# Patient Record
Sex: FEMALE | Race: WHITE | NOT HISPANIC OR LATINO
[De-identification: names, ages, dates, MRNs, and addresses within clinical notes are randomized per-mention and may not be internally consistent; named-entity substitution may affect disease eponyms.]

---

## 2019-02-15 PROBLEM — Z00.00 ENCOUNTER FOR PREVENTIVE HEALTH EXAMINATION: Status: ACTIVE | Noted: 2019-02-15

## 2019-03-04 ENCOUNTER — APPOINTMENT (OUTPATIENT)
Dept: NEUROLOGY | Facility: CLINIC | Age: 64
End: 2019-03-04
Payer: COMMERCIAL

## 2019-03-04 VITALS
SYSTOLIC BLOOD PRESSURE: 133 MMHG | HEART RATE: 97 BPM | WEIGHT: 125 LBS | BODY MASS INDEX: 26.24 KG/M2 | HEIGHT: 58 IN | DIASTOLIC BLOOD PRESSURE: 82 MMHG

## 2019-03-04 DIAGNOSIS — G20 PARKINSON'S DISEASE: ICD-10-CM

## 2019-03-04 PROCEDURE — 95983 ALYS BRN NPGT PRGRMG 15 MIN: CPT

## 2019-03-04 PROCEDURE — 99214 OFFICE O/P EST MOD 30 MIN: CPT

## 2019-03-04 NOTE — DISCUSSION/SUMMARY
[FreeTextEntry1] : PD s/p b/l GPi DBS with wearing offs c/b depression and sleep fragmentation\par \par Recommendations\par DBS adjusted\par       DBS programming 10minutes.\par \par Raise rytary 95 to 4-3-4-3 tabs (7-11-3-7)\par She can substitute last rytary for 1 tab parcopa when she going out\par cont remeron 45mg qhs\par increase melatonin to 7-8 mg qhs\par She will call neuropsychiatrist at [facility] close to where she lives for a consultation re. her depression\par Encouraged exercising\par RTC 3months

## 2019-03-04 NOTE — PHYSICAL EXAM
[FreeTextEntry1] : Emotional at times in the office. Mil masking and mild hypophonia. There are no tremors or LID. Rapid movements are 1.5+. tone is normal. stands easily and has 1 step hesitation. Cruises well with stable turns. Postural reflexes are intact

## 2019-03-04 NOTE — HISTORY OF PRESENT ILLNESS
[FreeTextEntry1] : Patient with PD s/p B/l DBS who is well known to me. She reports that when she takes 4 tabs of rytary 95 she gets 4hrs of ON time. Three tabs last about 3hrs and towards the evenings she feels slower with propensity to have freezing episodes. She does not have LID but feels internal tremoring. Continues to feel depressed and has not connected with a therapist. She does not have thoughts of harming herself but is bothered by her mobility issues in the evenings which have led to several falls. High protein dinner appears to blunt effect of rytary dose and finds that when she takes 1 tab of parcopa 25/100 during that time, her ON period is better. \par \par She gets about 5hrs of sleep with remeron and melatonin combination. Exercises every other day\par \par PD meds\par rytary 95 4-3-3-3 (7-11-3-7)\par remeron 45mg qhs\par melatonin 5mg qhs

## 2019-03-05 ENCOUNTER — TRANSCRIPTION ENCOUNTER (OUTPATIENT)
Age: 64
End: 2019-03-05

## 2019-04-19 ENCOUNTER — OTHER (OUTPATIENT)
Age: 64
End: 2019-04-19

## 2019-04-19 DIAGNOSIS — G47.00 INSOMNIA, UNSPECIFIED: ICD-10-CM

## 2019-04-24 ENCOUNTER — OTHER (OUTPATIENT)
Age: 64
End: 2019-04-24

## 2019-05-01 ENCOUNTER — OTHER (OUTPATIENT)
Age: 64
End: 2019-05-01

## 2019-05-13 RX ORDER — TRAZODONE HYDROCHLORIDE 100 MG/1
100 TABLET ORAL
Qty: 30 | Refills: 2 | Status: ACTIVE | COMMUNITY
Start: 2019-04-19 | End: 1900-01-01

## 2019-06-28 ENCOUNTER — OTHER (OUTPATIENT)
Age: 64
End: 2019-06-28

## 2019-07-08 ENCOUNTER — APPOINTMENT (OUTPATIENT)
Dept: NEUROLOGY | Facility: CLINIC | Age: 64
End: 2019-07-08
Payer: COMMERCIAL

## 2019-07-08 VITALS
HEART RATE: 82 BPM | BODY MASS INDEX: 24.35 KG/M2 | SYSTOLIC BLOOD PRESSURE: 108 MMHG | HEIGHT: 58 IN | WEIGHT: 116 LBS | DIASTOLIC BLOOD PRESSURE: 67 MMHG

## 2019-07-08 DIAGNOSIS — M54.2 CERVICALGIA: ICD-10-CM

## 2019-07-08 PROCEDURE — 99214 OFFICE O/P EST MOD 30 MIN: CPT

## 2019-07-08 PROCEDURE — 95983 ALYS BRN NPGT PRGRMG 15 MIN: CPT

## 2019-07-08 NOTE — HISTORY OF PRESENT ILLNESS
[FreeTextEntry1] : Patient reports that over the past 2 months she has been having episodic pain the base of her head and radiates onto her shoulder and occasionally into her arms. It is usually self-limited--lasting several minutes. She continues to take rytary four times per day. Her sleeping is much improved and gets 8 hrs per night. She takes linzess prn for constipation. She occasionally takes a parcopa at night for rigidity. Has sporadic truncal and LE dyskinesia that are bothersome. \par \par She started seeing a psychiatrist for depression/anxiety. She was started on prozac which has provided some benefit. She also sees a therapist. She has not been exercising regularly. C/o memory issues, sht term >long term. \par \par PD Meds\par rytary 4-3-4-3 (7-11-3-7)\par linzess\par trazodone 100mg qhs\par prozac 20mg qd\par \par \par

## 2019-07-08 NOTE — PHYSICAL EXAM
[FreeTextEntry1] : There is mild masking. There are mild dyskinesia in her feet that increase with distraction. There is a finger dystonia on her left hand with mild left side bradykinesia. tone is normal. Gait and station are stable. Neck is supple. Strength 5/5 with 2+ DTRs.

## 2019-07-08 NOTE — DISCUSSION/SUMMARY
[FreeTextEntry1] : PD with GPi DBS whose episodic cervicalgia may be radicular. \par Mild dyskinesia\par Depression is slowly improving and may be affecting her memory\par \par Plan\par - DBS adjusted\par - leave rytary regimen the same\par - MRI c/spine wo contrast \par - f/u GI re. linzess for constipation\par - f/u Psych for management of depression. \par - cont trazodone for fragmented sleep\par - she will fax recent blood work for me to review in light of memory issues\par \par RTO 3months

## 2019-07-08 NOTE — PROCEDURE
[FreeTextEntry1] : Deep Brain Stimulation Programming: Refer to scanned form(s)\par \par Impedances WNL both sitting and laying\par \par L/R DBS - dDBS utilized with increased PW to better control dyskinesia/dystonia

## 2019-07-11 ENCOUNTER — OTHER (OUTPATIENT)
Age: 64
End: 2019-07-11

## 2019-10-21 ENCOUNTER — APPOINTMENT (OUTPATIENT)
Dept: NEUROLOGY | Facility: CLINIC | Age: 64
End: 2019-10-21
Payer: COMMERCIAL

## 2019-10-21 VITALS
BODY MASS INDEX: 26.03 KG/M2 | SYSTOLIC BLOOD PRESSURE: 119 MMHG | HEIGHT: 58 IN | HEART RATE: 79 BPM | DIASTOLIC BLOOD PRESSURE: 72 MMHG | WEIGHT: 124 LBS

## 2019-10-21 PROCEDURE — 99214 OFFICE O/P EST MOD 30 MIN: CPT

## 2019-10-21 PROCEDURE — 95983 ALYS BRN NPGT PRGRMG 15 MIN: CPT

## 2019-10-22 NOTE — PROCEDURE
[FreeTextEntry1] : Deep Brain Stimulation Programming: Refer to scanned form(s)\par \par Increased PW b/l to try and control mild LID

## 2019-10-22 NOTE — PHYSICAL EXAM
[FreeTextEntry1] : There is mild masking. EOMI. Tremors are absent. Brad 1-2+ L>R. Tone is normal. Athetotic movements present in her toes. Gait is normal with no FOG. Left armswing is asymmetric. Postural reflexes intact

## 2019-10-22 NOTE — DISCUSSION/SUMMARY
[FreeTextEntry1] : FOG episodes vary and she feels that lower rytary dose yields a better response. \par Depression is better controlled. \par LID\par \par \par Recommendations:\par - DBS adjusted\par - lower rytary to 3 tabs QID\par - cont trazodone 100mg qhs\par - cont melatonin\par - psychotropics per psychiatry\par - monitor FOG occurrences in order to figure out extent of possible wearing offs\par \par RTO 3-4 months

## 2019-10-22 NOTE — HISTORY OF PRESENT ILLNESS
[FreeTextEntry1] : Since her last visit, her cervicalgia improved spontaneously. Her depression has improved and is maintained on Cymbalta. She reports freezing episodes throughout the day and has mild dyskinesia in her feet that did not improve with last DBS changes. She had 5 falls 3 weeks ago due to FOG. \par She experiences sporadic wearings offs after 3-4 hrs. Her constipation is better managed with linzess.\par \par PD Meds\par rytary 95 4-3-4-3 (7-11-3-7)\par linzess\par trazodone 100mg qhs\par melatonin 10mg qhs\par cymbalta 40mg qd\par celexa\par

## 2019-11-20 ENCOUNTER — RX RENEWAL (OUTPATIENT)
Age: 64
End: 2019-11-20

## 2019-12-16 ENCOUNTER — RX RENEWAL (OUTPATIENT)
Age: 64
End: 2019-12-16

## 2020-02-24 ENCOUNTER — APPOINTMENT (OUTPATIENT)
Dept: NEUROLOGY | Facility: CLINIC | Age: 65
End: 2020-02-24
Payer: COMMERCIAL

## 2020-02-24 VITALS
DIASTOLIC BLOOD PRESSURE: 77 MMHG | WEIGHT: 125 LBS | HEIGHT: 58 IN | BODY MASS INDEX: 26.24 KG/M2 | HEART RATE: 94 BPM | SYSTOLIC BLOOD PRESSURE: 133 MMHG

## 2020-02-24 PROCEDURE — 95970 ALYS NPGT W/O PRGRMG: CPT

## 2020-02-24 PROCEDURE — 99214 OFFICE O/P EST MOD 30 MIN: CPT

## 2020-02-24 NOTE — HISTORY OF PRESENT ILLNESS
[FreeTextEntry1] : Since her last visit, her depression has improved. She is doing well motorically. She does not have any motor fluctuations. LID are well controlled. \par \par She had a several falls due to freezing. FOG episodes occurs when turning--which are occasional. \par sleep is good. Has mild non bothersome toe curling dystonia on left foot. \par Linzess works well for constipation\par \par PD Meds\par rytary 95 3-3-3-3 (7-11-3-7)\par linzess\par trazodone 100mg qhs\par melatonin 10mg qhs\par cymbalta 40mg qd\par celexa\par abilify 5mg qd\par

## 2020-02-24 NOTE — DISCUSSION/SUMMARY
[FreeTextEntry1] : Patient is motorically stable\par Depression has improved\par \par Patient counseled to continue current PD regimen. Her DBS was not changed today. \par Increase exercising\par \par RTO 3-4months

## 2020-02-24 NOTE — PHYSICAL EXAM
[FreeTextEntry1] : There is mild masking. EOMI. Tremors are absent. Brad 1+ L>R. Tone is normal.  Gait is normal with no FOG. Left armswing is asymmetric. Postural reflexes intact

## 2020-02-24 NOTE — PROCEDURE
[FreeTextEntry1] : Deep Brain Stimulation Programming: Refer to scanned form(s)\par DBS programming- 5 mins

## 2020-07-07 ENCOUNTER — APPOINTMENT (OUTPATIENT)
Dept: NEUROLOGY | Facility: CLINIC | Age: 65
End: 2020-07-07

## 2020-07-24 ENCOUNTER — APPOINTMENT (OUTPATIENT)
Dept: NEUROLOGY | Facility: CLINIC | Age: 65
End: 2020-07-24
Payer: COMMERCIAL

## 2020-07-24 ENCOUNTER — APPOINTMENT (OUTPATIENT)
Dept: NEUROLOGY | Facility: CLINIC | Age: 65
End: 2020-07-24

## 2020-07-24 PROCEDURE — 99214 OFFICE O/P EST MOD 30 MIN: CPT | Mod: 95

## 2020-07-24 NOTE — HISTORY OF PRESENT ILLNESS
[FreeTextEntry1] : She has been in the house for the past few months. Her physical activity level has lessened. \par She is maintained on 3 tabs of rytary 4 times per day\par She has occasional wearing offs. However, she thinks it may attributed to food. Notes that protein intake clearly impacts dose efficacy. \par She has less morning akinesia since the last DBS adjustment\par Sleep is good\par Denies any dysphagia\par Urinary incontinence has increased\par Reports some toe curling that may be occurring towards the end of her dose. \par Has word finding difficulties \par Depression has increased during covid. Continues to see her psychiatrist\par \par PD Meds\par rytary 95 3-3-3-3 (7-11-3-7)\par linzess\par trazodone 100mg qhs\par melatonin 10mg qhs\par cymbalta 40mg qd\par celexa\par abilify 5mg qd\par

## 2020-07-24 NOTE — DISCUSSION/SUMMARY
[FreeTextEntry1] : PD with mild wearing off dystonia\par Mild cognitive deficits\par UI\par \par Patient copunseled on the following:\par - cont rytary regimen\par - will need DBS assessment in the office soon\par - f/u with psychiatrist\par - encouraged increased exercise. She would like to try neuriva for cognitive complaints\par - referred t urologist for UI\par  f/u 2-3months

## 2020-07-24 NOTE — PHYSICAL EXAM
[FreeTextEntry1] : There is mild masking. EOMI. Tremors are absent. Brad 1+ L>R.  Gait is normal with no FOG. Left armswing is asymmetric.

## 2020-09-08 ENCOUNTER — APPOINTMENT (OUTPATIENT)
Dept: NEUROLOGY | Facility: CLINIC | Age: 65
End: 2020-09-08
Payer: COMMERCIAL

## 2020-09-08 VITALS
SYSTOLIC BLOOD PRESSURE: 138 MMHG | BODY MASS INDEX: 28.97 KG/M2 | WEIGHT: 138 LBS | DIASTOLIC BLOOD PRESSURE: 85 MMHG | HEIGHT: 58 IN | HEART RATE: 99 BPM

## 2020-09-08 VITALS — TEMPERATURE: 97.6 F

## 2020-09-08 PROCEDURE — 95970 ALYS NPGT W/O PRGRMG: CPT

## 2020-09-08 PROCEDURE — 99214 OFFICE O/P EST MOD 30 MIN: CPT

## 2020-09-08 NOTE — DISCUSSION/SUMMARY
[FreeTextEntry1] : PD who is motorically stable and well controlled at this time\par \par \par Patient copunseled on the following:\par - cont rytary regimen\par - DBS unchanged. Will monitor battery levels. Will have [employer] rep instruct patient how to check her DBS IPGs monthly. \par - f/u with psychiatrist\par - encouraged increased exercise.\par -f/u PCP re. AM body aches. r/o rheumatological process\par  f/t4mgppyc

## 2020-09-08 NOTE — PROCEDURE
[FreeTextEntry1] : ABBOTT\par \par Right DBS\par Contacts:2A-3AC-\par Parameters:3.6/140/130\par Impedances:ok\par Battery:2.79\par \par Notes:\par \par Final Settings: \par \par Left DBS\par Contacts:2AB\par Parameters:3.3/130/130\par Impedances:4 H\par Battery:2.80\par \par Notes:\par \par Final Settings: unch\par \par \par DBS programming time(mins):5\par

## 2020-09-08 NOTE — PHYSICAL EXAM
[FreeTextEntry1] : There is mild masking. EOMI. Tremors are absent. Tone is normal Brad 1+.  Gait is normal with no FOG. Left armswing is asymmetric.

## 2020-09-08 NOTE — HISTORY OF PRESENT ILLNESS
[FreeTextEntry1] : \par Patient has been overall motorically stable. Denies any motor fluctuctions or LID. Toe curling at night has improved. she has gained weight from abilify. Depression persists but she has been socializing more. Sleeps well at night, however stay in one position. Does not have morning hypokinesia but c/o of diffuse body aches upon awakening. COgnition stable. UI improved with vesicare. \par \par \par PD Meds\par rytary 95 3-3-3-3 (7-11-3-7)\par linzess\par trazodone 100mg qhs\par melatonin 10mg qhs\par cymbalta 40mg qd\par celexa\par abilify 5mg qd\par vesicare 5mg qd\par

## 2020-12-07 ENCOUNTER — NON-APPOINTMENT (OUTPATIENT)
Age: 65
End: 2020-12-07

## 2021-02-10 ENCOUNTER — APPOINTMENT (OUTPATIENT)
Dept: NEUROLOGY | Facility: CLINIC | Age: 66
End: 2021-02-10
Payer: COMMERCIAL

## 2021-02-10 VITALS
DIASTOLIC BLOOD PRESSURE: 86 MMHG | WEIGHT: 132 LBS | HEART RATE: 102 BPM | SYSTOLIC BLOOD PRESSURE: 144 MMHG | HEIGHT: 58 IN | BODY MASS INDEX: 27.71 KG/M2

## 2021-02-10 PROCEDURE — 99072 ADDL SUPL MATRL&STAF TM PHE: CPT

## 2021-02-10 PROCEDURE — 95983 ALYS BRN NPGT PRGRMG 15 MIN: CPT

## 2021-02-10 PROCEDURE — 99214 OFFICE O/P EST MOD 30 MIN: CPT | Mod: 25

## 2021-02-10 NOTE — PROCEDURE
[FreeTextEntry1] : ABBOTT\par \par Right DBS\par Contacts:2A-3AC-\par Parameters:3.6/140/130\par Impedances:ok\par Battery:2.96\par \par Notes:\par \par Final Settings: unch\par \par Left DBS\par Contacts:2AB\par Parameters:3.3/130/130 Prg 1\par Impedances:2B, 4 H\par Battery:2.96\par \par Notes:switch stim off 2B and lowered amplitude to alleviate random dyskinesia\par \par Final Settings: 2A: 3.1/130/130 Prg2 (2.7-3.3)\par \par \par DBS programming time(mins):10\par

## 2021-02-10 NOTE — DISCUSSION/SUMMARY
[FreeTextEntry1] : PD who is motorically stable with GPi DBS\par r/o neuropathy in LE vs. raynauds\par \par Patient counseled on the following:\par - cont rytary regimen\par - DBS adjusted as outlined\par - f/u with psychiatrist\par - encouraged increased exercise. \par - referred to neuromuscular specialist in [place] for neuropathy eval.\par \par  f/u 4months

## 2021-06-15 ENCOUNTER — APPOINTMENT (OUTPATIENT)
Dept: NEUROLOGY | Facility: CLINIC | Age: 66
End: 2021-06-15
Payer: COMMERCIAL

## 2021-06-15 VITALS
WEIGHT: 125 LBS | DIASTOLIC BLOOD PRESSURE: 80 MMHG | HEIGHT: 58 IN | HEART RATE: 108 BPM | SYSTOLIC BLOOD PRESSURE: 122 MMHG | BODY MASS INDEX: 26.24 KG/M2

## 2021-06-15 PROCEDURE — 99214 OFFICE O/P EST MOD 30 MIN: CPT | Mod: 25

## 2021-06-15 PROCEDURE — 99072 ADDL SUPL MATRL&STAF TM PHE: CPT

## 2021-06-15 PROCEDURE — 95983 ALYS BRN NPGT PRGRMG 15 MIN: CPT

## 2021-06-15 RX ORDER — FLUOXETINE HYDROCHLORIDE 20 MG/1
20 CAPSULE ORAL
Refills: 0 | Status: DISCONTINUED | COMMUNITY
End: 2021-06-15

## 2021-06-15 NOTE — HISTORY OF PRESENT ILLNESS
[FreeTextEntry1] : Patient reports increased depression and anxiety\par She feels that her second dose of rytary  has delayed ON\par she is not exercising\par denies any falls or freezing\par Continues to be bothered by changes in in her feet- discoloration, ? cracking and numbness, \par A1c was <6 recently. Rheum w.u ongoing for possible raynauds\par Has intermittent truncal LID\par \par Nonmotor: \par sleep is ok\par no dysphagia\par \par PD Meds\par rytary 95 3-3-3-3 (7-11-3-7)\par linzess\par trazodone 100mg qhs\par melatonin 10mg qhs\par cymbalta 60mg qhs\par citomel 25mg qhs\par celexa 20mg qd\par vesicare 5mg qd\par

## 2021-06-15 NOTE — DISCUSSION/SUMMARY
[FreeTextEntry1] : PD with increased depression and off periods around her 2nd dose\par ?neuropathy\par \par Patient counseled on the following:\par - increase 2nd dose of rytary to 4 tabs, leave other doses the same\par - will need DBS assessment in the office soon\par - f/u with psychiatrist\par - encouraged increased exercise. \par - referred to Dr. Huddleston for neuropathy evaluation\par \par  f/u 2-3months

## 2021-06-15 NOTE — PHYSICAL EXAM
[FreeTextEntry1] : There is mild masking. EOMI. Tremors are absent. Brad 1+ L>R.  Gait is normal with no FOG. Left armswing is asymmetric. Soles of feet have a red hue. Skin changes noted on shins ?PVD\par Intact LT and JPS\par DTRS 2+ \par

## 2021-06-15 NOTE — PROCEDURE
[FreeTextEntry1] : ABBOTT\par \par Right DBS\par Contacts:2A-3AC-\par Parameters:3.6/140/130\par Impedances:ok\par Battery:2.95\par \par Notes:\par \par Final Settings: 3.7/140/130\par \par Left DBS\par Contacts:2A\par Parameters:3.10/130/130\par Impedances:1, 4 H, 2B\par Battery:2.95\par \par Notes:\par \par Final Settings: 3.3/130/130\par \par \par DBS programming time(mins):10\par

## 2021-08-16 ENCOUNTER — NON-APPOINTMENT (OUTPATIENT)
Age: 66
End: 2021-08-16

## 2021-09-03 ENCOUNTER — APPOINTMENT (OUTPATIENT)
Dept: NEUROLOGY | Facility: CLINIC | Age: 66
End: 2021-09-03
Payer: COMMERCIAL

## 2021-09-03 VITALS
HEIGHT: 58 IN | BODY MASS INDEX: 25.19 KG/M2 | SYSTOLIC BLOOD PRESSURE: 128 MMHG | DIASTOLIC BLOOD PRESSURE: 77 MMHG | HEART RATE: 88 BPM | WEIGHT: 120 LBS

## 2021-09-03 PROCEDURE — 99215 OFFICE O/P EST HI 40 MIN: CPT | Mod: 25

## 2021-09-03 PROCEDURE — 99072 ADDL SUPL MATRL&STAF TM PHE: CPT

## 2021-09-03 PROCEDURE — 95970 ALYS NPGT W/O PRGRMG: CPT

## 2021-09-10 ENCOUNTER — APPOINTMENT (OUTPATIENT)
Dept: NEUROLOGY | Facility: CLINIC | Age: 66
End: 2021-09-10

## 2021-09-13 DIAGNOSIS — K59.00 CONSTIPATION, UNSPECIFIED: ICD-10-CM

## 2021-09-27 ENCOUNTER — NON-APPOINTMENT (OUTPATIENT)
Age: 66
End: 2021-09-27

## 2021-10-01 ENCOUNTER — APPOINTMENT (OUTPATIENT)
Dept: NEUROLOGY | Facility: CLINIC | Age: 66
End: 2021-10-01

## 2022-01-07 ENCOUNTER — APPOINTMENT (OUTPATIENT)
Dept: NEUROLOGY | Facility: CLINIC | Age: 67
End: 2022-01-07
Payer: COMMERCIAL

## 2022-01-07 DIAGNOSIS — R41.89 OTHER SYMPTOMS AND SIGNS INVOLVING COGNITIVE FUNCTIONS AND AWARENESS: ICD-10-CM

## 2022-01-07 PROCEDURE — 99442: CPT | Mod: 95

## 2022-01-07 NOTE — DISCUSSION/SUMMARY
[FreeTextEntry1] : PD with cognitive deficits and LID\par \par Patient copunseled on the following:\par - consider reducing 1-2 doses of rytary to 2 tabs. She will keep log as to when she is getting bothersome LID\par - will check b12, TFTs, folate, B1, B6 as part of cognitive w/u. Will consider neuropsych testing at next visit\par \par f/u 3months

## 2022-01-07 NOTE — HISTORY OF PRESENT ILLNESS
[FreeTextEntry1] : Amwell failed. Switched to TTM\par \par Patient  reports increased LID. She cannot determine pattern to when they occur\par SHe feels her memory has declined. Her depression fluctuates and has been under great deal of stress due to recent move to [place].  She reports trouble remembering events that occurred on the same day.  No VH\par Denies any falls. May have slight start hesitation when getting out of a car. \par Tremors have broken through mildly 2/2 stressors\par \par \par \par Nonmotor: \par sleep is ok\par no constipation with linzess\par no dysphagia\par \par PD Meds\par rytary 95 3-3-3-3 (7-11-3-7)\par linzess\par trazodone 100mg qhs\par melatonin 10mg qhs\par cymbalta 60mg qhs\par citomel 25mg qhs\par celexa 20mg qd\par vesicare 5mg qd\par

## 2022-01-07 NOTE — REASON FOR VISIT
[Home] : at home, [unfilled] , at the time of the visit. [Medical Office: ([facility])___] : at the medical office located in  [Verbal consent obtained from patient] : the patient, [unfilled] [Follow-Up: _____] : a [unfilled] follow-up visit

## 2022-03-09 ENCOUNTER — NON-APPOINTMENT (OUTPATIENT)
Age: 67
End: 2022-03-09

## 2022-04-15 ENCOUNTER — APPOINTMENT (OUTPATIENT)
Dept: NEUROLOGY | Facility: CLINIC | Age: 67
End: 2022-04-15
Payer: COMMERCIAL

## 2022-04-15 VITALS
HEIGHT: 58 IN | DIASTOLIC BLOOD PRESSURE: 82 MMHG | HEART RATE: 90 BPM | WEIGHT: 118 LBS | SYSTOLIC BLOOD PRESSURE: 138 MMHG | BODY MASS INDEX: 24.77 KG/M2

## 2022-04-15 DIAGNOSIS — F99 MENTAL DISORDER, NOT OTHERWISE SPECIFIED: ICD-10-CM

## 2022-04-15 PROCEDURE — 99214 OFFICE O/P EST MOD 30 MIN: CPT | Mod: 25

## 2022-04-15 PROCEDURE — 95983 ALYS BRN NPGT PRGRMG 15 MIN: CPT

## 2022-04-15 NOTE — PHYSICAL EXAM
[FreeTextEntry1] : There is mild masking. EOMI. Tremors are absent. Brad 1+ L>R.  Gait is normal with no FOG. Left armswing is asymmetric. Mild overflow movements in LE

## 2022-04-15 NOTE — DISCUSSION/SUMMARY
[FreeTextEntry1] : PD with cognitive deficits and increased paranoid delusions. Rytary may be contributing to her neurobehavioral changes, but patient may have underlying vulnerability from PD. \par \par Patient was counseled on the following recommendations:\par \par - DBS adjusted\par - reduce rytary to 3-2-3-2. Will attempt to transition her to sinemet over time\par =f/u psych\par \par we will talk in 1month at which time i plan to  reduce rytary to 2 tabs qid\par \par f/u 3months\par

## 2022-04-15 NOTE — HISTORY OF PRESENT ILLNESS
[FreeTextEntry1] : Patient reports a falling out with her friends over the past several years that has engendered some level of paranoia. She thinks that her friends are communicating with her doctors and other people she recently met to discredit her. She associates all bad interpersonal encounters , which based on her 's account, are likely delusional. She also  describes hearing certain emotionally charged phrases more than once when in conversation and has increased difficult multitasking and managing stress. \par \par she does not wear off between her doses. Fell one month ago while adjusting drapes: fell backwards and injured her right shoulder. \par \par Nonmotor: \par sleep is ok\par no constipation with linzess\par no dysphagia\par Anxiety has increased and having difficulty managing stressful issues\par \par \par PD Meds\par rytary 95 3-3-3-3 (7-11-3-7)\par linzess\par trazodone 50mg qhs\par melatonin 10mg qhs\par cymbalta 60mg qhs\par citomel 25mg qhs\par celexa 20mg qd\par vesicare 5mg qd\par

## 2022-07-01 ENCOUNTER — APPOINTMENT (OUTPATIENT)
Dept: NEUROLOGY | Facility: CLINIC | Age: 67
End: 2022-07-01

## 2022-07-01 VITALS
BODY MASS INDEX: 25.61 KG/M2 | HEART RATE: 94 BPM | SYSTOLIC BLOOD PRESSURE: 130 MMHG | HEIGHT: 58 IN | WEIGHT: 122 LBS | DIASTOLIC BLOOD PRESSURE: 76 MMHG

## 2022-07-01 PROCEDURE — 99215 OFFICE O/P EST HI 40 MIN: CPT | Mod: 25

## 2022-07-01 PROCEDURE — 95983 ALYS BRN NPGT PRGRMG 15 MIN: CPT

## 2022-07-01 RX ORDER — LINACLOTIDE 145 UG/1
145 CAPSULE, GELATIN COATED ORAL
Qty: 90 | Refills: 3 | Status: ACTIVE | COMMUNITY
Start: 1900-01-01 | End: 1900-01-01

## 2022-07-01 NOTE — PROCEDURE
[FreeTextEntry1] : ABBOTT\par \par Right DBS\par Contacts:2A-3AC-\par Parameters:3.8/140/130\par Impedances:ok\par Battery:2.92\par \par Notes:3.9 tw\par \par Final Settings: 3.9/140/130 (3-4)\par \par Left DBS\par Contacts:2A\par Parameters:3.50/130/130\par Impedances: 4 H, 2B\par Battery:2.92\par \par Notes:3.7 TW\par \par Final Settings: 3.7/130/130 (3-4)\par \par \par DBS programming time(mins):10\par

## 2022-07-01 NOTE — PHYSICAL EXAM
[FreeTextEntry1] : There is mild masking. EOMI. Tremors are absent. Brad 1+ L>R.  Mild dyskinesia in her LE present. Gait is normal with no FOG.

## 2022-07-01 NOTE — HISTORY OF PRESENT ILLNESS
[FreeTextEntry1] : Patient is on lower dose of rytary with some reduction in paranoid ideation. \par Tolerated exelon 4.6mg and when she increased to 9.5 she had excessive salivation. She has since stopped it. \par Not exercising much\par Thinks she had COVID in early June with sore throat, headaches and fatigue for 1 week\par fell twice since her last visit: 1) while grabbing something in her closet over her head; she fell into the closet\par 2) occurred in the bedroom; details are not clear. Its hard for her to pick herself up w/o assistance when she falls. \par notes LE dyskinesia at rest\par \par \par Nonmotor: \par sleep is ok\par no constipation with linzess\par no dysphagia\par Anxiety- has social anxiety that exacerbates PD symptoms at times\par \par \par PD Meds\par rytary 95 3-2-3-2 (8-12-4-8)\par linzess\par trazodone 50mg qhs\par melatonin 10mg qhs\par cymbalta 60mg qhs\par citomel 25mg qhs\par celexa 20mg qd\par vesicare 5mg qd\par

## 2022-07-01 NOTE — DISCUSSION/SUMMARY
[FreeTextEntry1] : PD with cognitive deficits and increased paranoid delusions that appears to be responding to lower Rytary. Social anxiety is risking increased isolation and can unravel her MDD\par \par Patient was counseled on the following recommendations:\par \par - DBS adjusted\par - reduce rytary to 2-2-2-2. will utilize sinemet + rytary in the future if needed\par -f/u psych\par - increase exercising \par \par f/u 3months\par

## 2022-11-18 ENCOUNTER — APPOINTMENT (OUTPATIENT)
Dept: NEUROLOGY | Facility: CLINIC | Age: 67
End: 2022-11-18

## 2022-11-18 PROCEDURE — 99214 OFFICE O/P EST MOD 30 MIN: CPT | Mod: 95

## 2022-11-18 RX ORDER — RIVASTIGMINE 9.5 MG/24H
9.5 PATCH, EXTENDED RELEASE TRANSDERMAL DAILY
Qty: 30 | Refills: 3 | Status: COMPLETED | COMMUNITY
Start: 2022-04-15 | End: 2022-11-18

## 2022-11-18 RX ORDER — CARBIDOPA AND LEVODOPA 25; 100 MG/1; MG/1
25-100 TABLET, ORALLY DISINTEGRATING ORAL 3 TIMES DAILY
Qty: 270 | Refills: 1 | Status: COMPLETED | COMMUNITY
End: 2022-11-18

## 2022-11-18 RX ORDER — CARBIDOPA AND LEVODOPA 25; 100 MG/1; MG/1
25-100 TABLET, ORALLY DISINTEGRATING ORAL DAILY
Qty: 90 | Refills: 3 | Status: COMPLETED | COMMUNITY
Start: 2019-03-04 | End: 2022-11-18

## 2022-11-18 RX ORDER — CARBIDOPA AND LEVODOPA 25; 100 MG/1; MG/1
25-100 TABLET ORAL
Qty: 180 | Refills: 3 | Status: ACTIVE | COMMUNITY
Start: 2022-11-18 | End: 1900-01-01

## 2022-11-18 NOTE — REASON FOR VISIT
[Follow-Up: _____] : a [unfilled] follow-up visit [Home] : at home, [unfilled] , at the time of the visit. [Medical Office: ([facility])___] : at the medical office located in

## 2022-11-20 NOTE — DISCUSSION/SUMMARY
[FreeTextEntry1] : PD with improved paranoia likely related to reduction in rytary. Wearing offs have increased\par depression alejandro somewhat  worsened  with persistent social anxiety\par \par Patient was counseled on the following recommendations:\par - will add sinemet  1/2 tab qid with ea dose of rytary\par leave rytary regimen the same\par f/u psychiatrist but also recommend CBT to address some of her social anxieties\par \par \par f/u 3months\par

## 2022-11-20 NOTE — PHYSICAL EXAM
[FreeTextEntry1] : End of dose. \par \par There is mild masking. EOMI. Tremors are absent. Brad 1+ L>R.  Gait is normal with no FOG. Left armswing is asymmetric.

## 2022-11-20 NOTE — HISTORY OF PRESENT ILLNESS
[FreeTextEntry1] : Patient is taking rytary 95 2 tabs qid. Paranoid ideation is better. Wearing off more -- at least 30 mins prior to next dose. \par She had a period of improved executive dysfunction which has now regressed\par Denies any falls\par Takes 20 mins for rytary to take effect\par Gets left leg tremulous movements in the off periods. \par Admits to having fears of socializing and going for walks due to concern of falling\par \par Nonmotor: \par sleep is ok\par no constipation with linzess\par no dysphagia\par depression persist and following with psych\par \par \par PD Meds\par rytary 95 2-2-2-2 (8-12-4-8)\par linzess\par trazodone 50mg qhs\par melatonin 10mg qhs\par cymbalta 60mg qhs\par citomel 25mg qhs\par celexa 20mg qd\par vesicare 5mg qd\par

## 2022-12-19 ENCOUNTER — NON-APPOINTMENT (OUTPATIENT)
Age: 67
End: 2022-12-19

## 2023-05-25 ENCOUNTER — APPOINTMENT (OUTPATIENT)
Dept: NEUROLOGY | Facility: CLINIC | Age: 68
End: 2023-05-25
Payer: COMMERCIAL

## 2023-05-25 VITALS
TEMPERATURE: 97.3 F | OXYGEN SATURATION: 96 % | HEIGHT: 58 IN | SYSTOLIC BLOOD PRESSURE: 129 MMHG | HEART RATE: 95 BPM | DIASTOLIC BLOOD PRESSURE: 78 MMHG | BODY MASS INDEX: 27.71 KG/M2 | WEIGHT: 132 LBS

## 2023-05-25 PROCEDURE — 99214 OFFICE O/P EST MOD 30 MIN: CPT

## 2023-05-25 RX ORDER — ALENDRONATE SODIUM 70 MG/1
70 TABLET ORAL
Refills: 0 | Status: DISCONTINUED | COMMUNITY
End: 2023-05-25

## 2023-05-25 NOTE — DISCUSSION/SUMMARY
[FreeTextEntry1] : PD with improved paranoia likely related to reduction in rytary. Wearing offs have improved and she stopped taking the extra 1/2 pil sinemet with her rytary doses. Depression has remained stable and follows a psychiatrist. persistent social anxiety. Overall she appears stable.\par \par could not get MRI done in [place] for lower back sciata d/t impedance in DBS abbot and unable to turn off. \par Unable to interrogate DBS today d/t malfunctioning IPAD.\par \par Patient was counseled on the following recommendations:\par leave rytary regimen the same\par f/u psychiatrist \par - RTC in a few weeks for potential remote programming. \par \par Case discussed and pt examined with movement attending Dr. Zavaleta\par \par Abisai Sanchez MD\par Movement Fellow

## 2023-05-25 NOTE — HISTORY OF PRESENT ILLNESS
[FreeTextEntry1] : was in [place] for a few months, leads were broken \par Left sciatic pain resolved and only rarely gets it. had a injection in [place] which did not benefit. \par wearing off isnt bothering her as much \par Was initially taking sinemet with the rytary. \par - varied exercise, juana. \par - sleep is good \par \par \par Patient is taking rytary 95 2 tabs qid. \par Denies any falls\par Takes 20 mins for rytary to take effect\par Gets right arm tremors in the day which bother her.  \par Admits to having fears of socializing and going for walks due to concern of falling\par \par Nonmotor: \par sleep is ok, no RBD sx's. \par no constipation with linzess, BM daily\par no dysphagia\par depression persist with episodes lasting a day but not longer and following with psych. no talking therpay. \par \par \par PD Meds\par rytary 95 2-2-2-2 (8-12-4-8)\par linzess\par trazodone 75mg qhs\par melatonin 10mg qhs\par cymbalta 60mg qhs\par citomel 25mg qhs\par celexa 20mg qd\par vesicare 5mg qd\par

## 2023-05-25 NOTE — PHYSICAL EXAM
[FreeTextEntry1] : ON state\par \par intermittent slight resting tremor in the right arm.\par \par slight bradykinesias R>L\par no rigidity\par able to stand without using hands\par slight start hesitation but walking with good strides. slight hunched posture\par negative pull test

## 2023-05-25 NOTE — END OF VISIT
[] : Fellow [Time Spent: ___ minutes] : I have spent [unfilled] minutes of time on the encounter. [FreeTextEntry3] : Since her last visit, she developed left side sciatica while in [place]. It has improved and no longer bothersome. Rytary 95 2 tabs qid is tolerated well and paranoia has subsidized. denies sustained bouts of depression but suffers from social anxiety. Having more right hand breakthrough tremor. \par On exam, 1+ masking. Speech 2+. There is 2+ intermittent right hand rest tremor. There is mild bradykinesia with 1+ rigidity. Gait is normal with intact pull test\par Imp: Motorically stable on current rytary regimen. Unable to connect her DBS today due to ipad failure. Will arrange virtual visit w/n 1 week to assess stimulation. Leave med regimen the same. f/u with psychiatry

## 2023-06-01 ENCOUNTER — APPOINTMENT (OUTPATIENT)
Dept: NEUROLOGY | Facility: CLINIC | Age: 68
End: 2023-06-01
Payer: COMMERCIAL

## 2023-06-01 PROCEDURE — 95970 ALYS NPGT W/O PRGRMG: CPT | Mod: 95

## 2023-06-01 NOTE — PROCEDURE
[FreeTextEntry3] : DBS Remote Programming\par \par ABBOTT\par \par Right DBS\par Contacts:2A-3AC-\par Parameters:3.9/140/130\par Impedances:ok\par Battery:2.83\par \par Notes:\par \par Final Settings: no changes (3-4)\par \par Left DBS\par Contacts:2A\par Parameters:3.70/130/130\par Impedances: 4 H, 2B\par Battery:2.81\par \par Notes:\par \par Final Settings: 3.7/130/130(3-4)\par \par \par DBS programming time(mins):5

## 2023-07-11 ENCOUNTER — NON-APPOINTMENT (OUTPATIENT)
Age: 68
End: 2023-07-11

## 2023-08-25 ENCOUNTER — APPOINTMENT (OUTPATIENT)
Dept: NEUROLOGY | Facility: CLINIC | Age: 68
End: 2023-08-25
Payer: COMMERCIAL

## 2023-08-25 VITALS
HEIGHT: 58 IN | BODY MASS INDEX: 26.87 KG/M2 | HEART RATE: 98 BPM | SYSTOLIC BLOOD PRESSURE: 167 MMHG | DIASTOLIC BLOOD PRESSURE: 93 MMHG | WEIGHT: 128 LBS

## 2023-08-25 PROCEDURE — 95970 ALYS NPGT W/O PRGRMG: CPT

## 2023-08-25 PROCEDURE — 99214 OFFICE O/P EST MOD 30 MIN: CPT | Mod: 25

## 2023-08-25 RX ORDER — PIMAVANSERIN TARTRATE 34 MG/1
34 CAPSULE ORAL
Refills: 0 | Status: ACTIVE | COMMUNITY

## 2023-08-25 NOTE — PHYSICAL EXAM
[Oriented To Time, Place, And Person] : oriented to person, place, and time [Impaired Insight] : insight and judgment were intact [FreeTextEntry1] :   There is mild masking. EOMI. there is an irregular tremor in the right hand of variable frequency that is distractible at times.  Her rapid movements are 1+.  She walks slower with deliberate steps in normal terms.  There is no freezing of gait.  There is no dyskinesia.

## 2023-08-25 NOTE — DISCUSSION/SUMMARY
[FreeTextEntry1] : PD with increased delusions and hallucinatory phenomenon.  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  Will refer to Dr. Chapman for IPG replacement -Leave medication regimen unchanged.  She will follow-up with Dr. figueroa a neuropsychiatrist next week. She prefers not to do physical therapy at this time   f/u 2months

## 2023-08-25 NOTE — PROCEDURE
[FreeTextEntry1] : ROBB  Right DBS Contacts:2A-3AC- Parameters:3.9/140/130 Impedances:ok Battery:2.78  Notes:  Final Settings: nochanges  Left DBS Contacts:2A Parameters:3.70/130/130 Impedances: 4 H, 2B Battery:2.74  Notes:  Final Settings: 3.5/130/130   DBS programming time(mins):5

## 2023-08-25 NOTE — HISTORY OF PRESENT ILLNESS
[FreeTextEntry1] : Patient's psychiatric state has declined since her last visit. she feels that something is in her and cause her limbs to shake at time. She hears voices and was recently hospitalized after she took 15 tabs of trazodone at the request of the auditory hallucination.  Her  tells me that 1 morning he awoke to find and paramedics were called by the patient who complained about numbness in her feet due to diabetes.    She was started on nuplazid by her psychiatrist 1 month ago during a 1 week admission at [facility]. The addition of nuplazid has not provided much benefit. She does not wear off or have dyskinesia.    Nonmotor:  sleep is ok, no RBD sx's.  no constipation with linzess, BM daily no dysphagia depression persist with episodes lasting a day but not longer and following with psych. no talking therpay.    PD Meds rytary 95 2-2-2-2 (8-12-4-8) linzess prn nuplazid 34mg qd trazodone 75mg qhs melatonin 10mg qhs cymbalta 60mg qhs citomel 25mg qhs celexa 20mg qd vesicare 5mg qd

## 2023-10-26 ENCOUNTER — APPOINTMENT (OUTPATIENT)
Dept: NEUROLOGY | Facility: CLINIC | Age: 68
End: 2023-10-26

## 2023-12-14 ENCOUNTER — APPOINTMENT (OUTPATIENT)
Dept: NEUROLOGY | Facility: CLINIC | Age: 68
End: 2023-12-14
Payer: COMMERCIAL

## 2023-12-14 PROCEDURE — 99214 OFFICE O/P EST MOD 30 MIN: CPT | Mod: 25,95

## 2023-12-14 PROCEDURE — 95983 ALYS BRN NPGT PRGRMG 15 MIN: CPT | Mod: 95

## 2023-12-14 NOTE — HISTORY OF PRESENT ILLNESS
[FreeTextEntry1] : Patient having wearing offs after 3hrs. Gets tremors in her limbs for about 1hr. Batteries were replaced last month by Dr. Chapman She wondered out of her apt recently and was found by a  who called 911. She says that a voice told her to leave the apartment. She was taken to local hospital where HCT was negative. She feels that she hears more voices that tell her to hurt herself by falling.  Has had several falls since that episode with possible freezing episode occuring prior to them    Current psychiatrist is cross titrating her to prozac. Last saw him 2 weeks ago  Nonmotor:  sleep is ok, no RBD sx's.  no constipation with linzess, BM daily no dysphagia  PD Meds rytary 95 2-2-2-2 (8-12-4-8) linzess prn nuplazid 34mg qd trazodone 75mg qhs melatonin 10mg qhs citomel 25mg qhs celexa 20mg qd vesicare 5mg qd

## 2023-12-14 NOTE — PHYSICAL EXAM
[FreeTextEntry1] : Took dose of rytary 20 minutes ago There is mild masking. EOMI. there is a tremor in the right hand at rest.  Her rapid movements are 1+.  She walks slower with deliberate steps in normal terms.  Looks unsteady and needs to hold on to her

## 2023-12-14 NOTE — DISCUSSION/SUMMARY
[FreeTextEntry1] : PD with increased delusions and hallucinatory phenomenon.  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 Increased motor fluctuations likely tied to psychiatric decompensation.   Patient was counseled on the following recommendations: DBS adjusted cont current rytary regimen -cont nuplazid 34mg qd  will see Dr. Ruffin at [facility] in 2 weeks  f/u 2months

## 2024-01-09 ENCOUNTER — APPOINTMENT (OUTPATIENT)
Dept: NEUROLOGY | Facility: CLINIC | Age: 69
End: 2024-01-09
Payer: COMMERCIAL

## 2024-01-09 VITALS — DIASTOLIC BLOOD PRESSURE: 78 MMHG | HEART RATE: 91 BPM | SYSTOLIC BLOOD PRESSURE: 131 MMHG

## 2024-01-09 VITALS — HEIGHT: 58 IN

## 2024-01-09 PROCEDURE — 95983 ALYS BRN NPGT PRGRMG 15 MIN: CPT

## 2024-01-09 PROCEDURE — 99214 OFFICE O/P EST MOD 30 MIN: CPT | Mod: 25

## 2024-01-12 RX ORDER — LEVODOPA AND CARBIDOPA 95; 23.75 MG/1; MG/1
23.75-95 CAPSULE, EXTENDED RELEASE ORAL
Qty: 50 | Refills: 0 | Status: ACTIVE | COMMUNITY
Start: 1900-01-01 | End: 1900-01-01

## 2024-05-04 NOTE — HISTORY OF PRESENT ILLNESS
[FreeTextEntry1] : Since her last visit, she had both IPG replaced by Dr. Randle. \par Fell 2 weeks ago in her bedroom while going to the bathroom. Attributes it to drowsiness from her hs meds. She it left side of her body w/o sig. injury. \par Has not had motor fluctuations or dystonia. Occasional truncal dyskinesia have occurred since the replacement, but were self-limited\par Also reports discoloration of her toes at times along with night time sensitivity in them. \par Depression has increased due to limited social interaction from pandemic\par \par PD Meds\par rytary 95 3-3-3-3 (7-11-3-7)\par linzess\par trazodone 100mg qhs\par melatonin 10mg qhs\par cymbalta 40mg qhs\par citomel 25mg qhs\par celexa\par vesicare 5mg qd\par  No

## 2024-05-08 NOTE — REASON FOR VISIT
[Follow-Up: _____] : a [unfilled] follow-up visit Consent: The risks of atrophy were reviewed with the patient. Administered By (Optional): FRANCESCO How Many Mls Were Removed From The 40 Mg/Ml (5ml) Vial When Preparing The Injectable Solution?: 0 Bill For Wasted Drug (Kenalog)?: no Kenalog Preparation: Kenalog Total Volume (Ccs): .2 Treatment Number (Optional): 1 Which Kenalog Vial Was Used?: Kenalog 10 mg/ml (5 ml vial) Detail Level: Simple Validate Note Data When Using Inventory: Yes Concentration Of Kenalog Solution Injected (Mg/Ml): 5.0 Kenalog Type Of Vial: Multiple Dose Medical Necessity Clause: This procedure was medically necessary because the lesions that were treated were: How Many Mls Were Removed From The 10 Mg/Ml (5ml) Vial When Preparing The Injectable Solution?: 0.5 Show Inventory Tab: Show